# Patient Record
Sex: MALE | Race: WHITE | Employment: OTHER | URBAN - METROPOLITAN AREA
[De-identification: names, ages, dates, MRNs, and addresses within clinical notes are randomized per-mention and may not be internally consistent; named-entity substitution may affect disease eponyms.]

---

## 2017-12-19 ENCOUNTER — TELEPHONE (OUTPATIENT)
Dept: INTERNAL MEDICINE CLINIC | Facility: CLINIC | Age: 47
End: 2017-12-19

## 2017-12-19 DIAGNOSIS — Z12.5 SCREENING FOR PROSTATE CANCER: ICD-10-CM

## 2017-12-19 DIAGNOSIS — Z13.0 SCREENING FOR BLOOD DISEASE: Primary | ICD-10-CM

## 2017-12-19 DIAGNOSIS — Z13.220 SCREENING FOR LIPID DISORDERS: ICD-10-CM

## 2017-12-19 DIAGNOSIS — Z13.29 SCREENING FOR THYROID DISORDER: ICD-10-CM

## 2017-12-19 DIAGNOSIS — Z13.228 SCREENING FOR METABOLIC DISORDER: ICD-10-CM

## 2017-12-19 NOTE — TELEPHONE ENCOUNTER
Please place orders for lab work to send to [facility] for upcoming physical on 1-5-2018  He wants to go to the lab on 12-27  thanks

## 2018-01-05 ENCOUNTER — OFFICE VISIT (OUTPATIENT)
Dept: INTERNAL MEDICINE CLINIC | Facility: CLINIC | Age: 48
End: 2018-01-05

## 2018-01-05 VITALS
HEIGHT: 74 IN | TEMPERATURE: 99 F | HEART RATE: 92 BPM | DIASTOLIC BLOOD PRESSURE: 68 MMHG | BODY MASS INDEX: 25.67 KG/M2 | SYSTOLIC BLOOD PRESSURE: 118 MMHG | WEIGHT: 200 LBS

## 2018-01-05 DIAGNOSIS — Z00.00 ROUTINE PHYSICAL EXAMINATION: Primary | ICD-10-CM

## 2018-01-05 PROCEDURE — 90714 TD VACC NO PRESV 7 YRS+ IM: CPT | Performed by: NURSE PRACTITIONER

## 2018-01-05 PROCEDURE — 99386 PREV VISIT NEW AGE 40-64: CPT | Performed by: NURSE PRACTITIONER

## 2018-01-05 PROCEDURE — 90471 IMMUNIZATION ADMIN: CPT | Performed by: NURSE PRACTITIONER

## 2018-01-05 NOTE — PROGRESS NOTES
Clarence Tirado is a 41year old male who presents to establish with our practice and for a complete physical exam.     HPI:   Pt has no complaints. Works as [employer]. Does not exercise routinely. Feels well. Take no prescription medications.      Wt Readin pain/tenderness. Denies neck stiffness. LUNGS: denies dyspnea, wheezing, SOB, QUESADA, cough. CARDIOVASCULAR: denies chest pain on exertion, palpitations, edema, orthopnea. GI: denies abdominal pain, heartburn, diarrhea or constipation.   : denies dysuria, slightly elevated. Discussed exercise will help with these numbers. Td booster given today. F/U in 1 year for CPE, sooner if problems.        Orders Placed This Encounter      Td (Tenivac) (10187) (DX V06.5/Z23)    Meds & Refills for this Visit:  No prescri

## 2018-04-30 ENCOUNTER — OFFICE VISIT (OUTPATIENT)
Dept: INTERNAL MEDICINE CLINIC | Facility: CLINIC | Age: 48
End: 2018-04-30

## 2018-04-30 VITALS
BODY MASS INDEX: 25.41 KG/M2 | WEIGHT: 198 LBS | DIASTOLIC BLOOD PRESSURE: 74 MMHG | SYSTOLIC BLOOD PRESSURE: 118 MMHG | HEIGHT: 74 IN | TEMPERATURE: 99 F | RESPIRATION RATE: 16 BRPM | HEART RATE: 72 BPM

## 2018-04-30 DIAGNOSIS — R10.84 GENERALIZED ABDOMINAL PAIN: Primary | ICD-10-CM

## 2018-04-30 PROCEDURE — 99213 OFFICE O/P EST LOW 20 MIN: CPT | Performed by: NURSE PRACTITIONER

## 2018-04-30 RX ORDER — SIMETHICONE 80 MG
80 TABLET,CHEWABLE ORAL 3 TIMES DAILY PRN
Qty: 30 TABLET | Refills: 0 | Status: SHIPPED | OUTPATIENT
Start: 2018-04-30 | End: 2018-05-07

## 2018-04-30 RX ORDER — OMEPRAZOLE 40 MG/1
40 CAPSULE, DELAYED RELEASE ORAL DAILY
Qty: 30 CAPSULE | Refills: 0 | Status: SHIPPED | OUTPATIENT
Start: 2018-04-30 | End: 2018-05-07

## 2018-04-30 NOTE — PROGRESS NOTES
Patient presents with:  Stomach Pain: pt is having stomach pain x 7 weeks. LB-room 7      HPI:  Presents with approx 7 week history of intermittent abd pain with associated bloating and \"gassy\" feeling.  Stated has had occasional nausea with abd pains but normoactive. Skin: Skin is warm and dry. No rash noted. No erythema. No pallor. A/P:    Generalized abdominal pain  (primary encounter diagnosis)- Unclear etiology. Start omeprazole. Simethicone PRN. Check CBC/CMP. See Dr. Aida Hernandez- GI.  Verbalized und

## 2018-08-21 ENCOUNTER — HOSPITAL ENCOUNTER (OUTPATIENT)
Dept: MRI IMAGING | Facility: HOSPITAL | Age: 48
Discharge: HOME | End: 2018-08-21
Attending: INTERNAL MEDICINE
Payer: COMMERCIAL

## 2018-08-21 DIAGNOSIS — R10.13 EPIGASTRIC PAIN: ICD-10-CM

## 2018-08-21 DIAGNOSIS — R63.4 WEIGHT LOSS: ICD-10-CM

## 2018-08-21 DIAGNOSIS — R19.7 DIARRHEA, UNSPECIFIED TYPE: ICD-10-CM

## 2018-08-21 PROCEDURE — A9576 INJ PROHANCE MULTIPACK: HCPCS | Performed by: INTERNAL MEDICINE

## 2018-08-21 PROCEDURE — 74183 MRI ABD W/O CNTR FLWD CNTR: CPT | Performed by: INTERNAL MEDICINE

## 2018-08-21 PROCEDURE — 72197 MRI PELVIS W/O & W/DYE: CPT | Performed by: INTERNAL MEDICINE

## 2019-01-04 ENCOUNTER — WALK IN (OUTPATIENT)
Dept: URGENT CARE | Age: 49
End: 2019-01-04

## 2019-01-04 VITALS
SYSTOLIC BLOOD PRESSURE: 104 MMHG | HEIGHT: 74 IN | BODY MASS INDEX: 24.64 KG/M2 | DIASTOLIC BLOOD PRESSURE: 72 MMHG | TEMPERATURE: 98.9 F | RESPIRATION RATE: 18 BRPM | WEIGHT: 192 LBS | HEART RATE: 97 BPM

## 2019-01-04 DIAGNOSIS — B96.89 ACUTE BACTERIAL SINUSITIS: Primary | ICD-10-CM

## 2019-01-04 DIAGNOSIS — J01.90 ACUTE BACTERIAL SINUSITIS: Primary | ICD-10-CM

## 2019-01-04 PROCEDURE — 99203 OFFICE O/P NEW LOW 30 MIN: CPT | Performed by: NURSE PRACTITIONER

## 2019-01-04 RX ORDER — FLUTICASONE PROPIONATE 50 MCG
SPRAY, SUSPENSION (ML) NASAL
Qty: 16 G | Refills: 0 | Status: SHIPPED | OUTPATIENT
Start: 2019-01-04

## 2019-01-04 RX ORDER — AMOXICILLIN AND CLAVULANATE POTASSIUM 875; 125 MG/1; MG/1
1 TABLET, FILM COATED ORAL EVERY 12 HOURS
Qty: 20 TABLET | Refills: 0 | Status: SHIPPED | OUTPATIENT
Start: 2019-01-04 | End: 2019-01-14

## 2019-01-04 SDOH — HEALTH STABILITY: MENTAL HEALTH: HOW OFTEN DO YOU HAVE A DRINK CONTAINING ALCOHOL?: NEVER

## 2019-01-04 ASSESSMENT — ENCOUNTER SYMPTOMS
APPETITE CHANGE: 1
VOMITING: 0
WEAKNESS: 0
CONSTIPATION: 0
ADENOPATHY: 0
SHORTNESS OF BREATH: 0
SINUS PAIN: 1
STRIDOR: 0
DIARRHEA: 0
CHILLS: 1
HEADACHES: 1
EYE REDNESS: 0
WHEEZING: 0
SINUS PRESSURE: 1
ABDOMINAL PAIN: 0
EYE PAIN: 0
RHINORRHEA: 0
EYE DISCHARGE: 0
EYE ITCHING: 0
FATIGUE: 0
FEVER: 0
ACTIVITY CHANGE: 0
SORE THROAT: 0
TROUBLE SWALLOWING: 0
CHEST TIGHTNESS: 0
NAUSEA: 0
COUGH: 1
PHOTOPHOBIA: 0

## 2019-12-09 ENCOUNTER — TELEPHONE (OUTPATIENT)
Dept: INTERNAL MEDICINE CLINIC | Facility: CLINIC | Age: 49
End: 2019-12-09

## 2019-12-09 DIAGNOSIS — Z13.29 SCREENING FOR THYROID DISORDER: ICD-10-CM

## 2019-12-09 DIAGNOSIS — Z12.5 SCREENING FOR PROSTATE CANCER: ICD-10-CM

## 2019-12-09 DIAGNOSIS — Z13.228 SCREENING FOR METABOLIC DISORDER: ICD-10-CM

## 2019-12-09 DIAGNOSIS — Z00.00 ROUTINE PHYSICAL EXAMINATION: Primary | ICD-10-CM

## 2019-12-09 DIAGNOSIS — Z13.220 SCREENING FOR LIPID DISORDERS: ICD-10-CM

## 2019-12-09 DIAGNOSIS — Z13.0 SCREENING FOR BLOOD DISEASE: ICD-10-CM

## 2019-12-09 NOTE — TELEPHONE ENCOUNTER
Future Appointments   Date Time Provider Angus Sandra   1/3/2020 10:00 AM Tiago Perez MD [facility]     [facility] to [facility] per patient- Pt aware to fast-no call back required

## 2020-01-03 ENCOUNTER — OFFICE VISIT (OUTPATIENT)
Dept: INTERNAL MEDICINE CLINIC | Facility: CLINIC | Age: 50
End: 2020-01-03
Payer: COMMERCIAL

## 2020-01-03 VITALS
HEIGHT: 72.44 IN | DIASTOLIC BLOOD PRESSURE: 58 MMHG | RESPIRATION RATE: 16 BRPM | HEART RATE: 64 BPM | BODY MASS INDEX: 24.78 KG/M2 | TEMPERATURE: 99 F | SYSTOLIC BLOOD PRESSURE: 104 MMHG | WEIGHT: 185 LBS

## 2020-01-03 DIAGNOSIS — R73.01 ELEVATED FASTING BLOOD SUGAR: ICD-10-CM

## 2020-01-03 DIAGNOSIS — Z00.00 ANNUAL PHYSICAL EXAM: Primary | ICD-10-CM

## 2020-01-03 DIAGNOSIS — R10.13 CHRONIC EPIGASTRIC PAIN: ICD-10-CM

## 2020-01-03 DIAGNOSIS — G89.29 CHRONIC EPIGASTRIC PAIN: ICD-10-CM

## 2020-01-03 PROCEDURE — 99396 PREV VISIT EST AGE 40-64: CPT | Performed by: INTERNAL MEDICINE

## 2020-01-03 RX ORDER — MULTIVIT-MIN/IRON FUM/FOLIC AC 7.5 MG-4
1 TABLET ORAL DAILY
COMMUNITY

## 2020-01-03 NOTE — PROGRESS NOTES
Ansley Farnsworth  11/30/1970    Patient presents with:  Wellness Visit  Vaccinations: defers Flu      HPI:   Ansley Farnsworth is a 54year old male who presents for annual physical examination.     The patient has been in his usual state of health and denies an Drinks per session: 1 or 2      Binge frequency: Never      Comment: CAGE 1/3/20    Drug use: No        REVIEW OF SYSTEMS:   GENERAL: feels well otherwise  SKIN: no rashes  EYES:denies blurred vision or double vision  HEENT: not congested  LUNGS: denies sh Thank you,  Minnie Balderrama MD

## 2020-01-29 LAB — HEMOGLOBIN A1C: 5.6 % OF TOTAL HGB

## 2020-10-08 PROBLEM — M19.041 OSTEOARTHRITIS OF METACARPOPHALANGEAL (MCP) JOINT OF RIGHT MIDDLE FINGER: Status: ACTIVE | Noted: 2020-10-08

## 2020-10-08 PROBLEM — M19.041 OSTEOARTHRITIS OF METACARPOPHALANGEAL (MCP) JOINT OF RIGHT INDEX FINGER: Status: ACTIVE | Noted: 2020-10-08

## 2020-11-30 PROBLEM — M77.11 RIGHT LATERAL EPICONDYLITIS: Status: ACTIVE | Noted: 2020-11-30

## 2021-03-22 ENCOUNTER — TELEPHONE (OUTPATIENT)
Dept: INTERNAL MEDICINE CLINIC | Facility: CLINIC | Age: 51
End: 2021-03-22

## 2021-03-22 DIAGNOSIS — Z13.29 SCREENING FOR THYROID DISORDER: ICD-10-CM

## 2021-03-22 DIAGNOSIS — Z13.0 SCREENING FOR BLOOD DISEASE: ICD-10-CM

## 2021-03-22 DIAGNOSIS — Z13.228 SCREENING FOR METABOLIC DISORDER: ICD-10-CM

## 2021-03-22 DIAGNOSIS — Z13.220 SCREENING FOR LIPID DISORDERS: ICD-10-CM

## 2021-03-22 DIAGNOSIS — Z00.00 ROUTINE PHYSICAL EXAMINATION: Primary | ICD-10-CM

## 2021-03-22 NOTE — TELEPHONE ENCOUNTER
CPE   Future Appointments   Date Time Provider Angus Sandra   4/20/2021  5:20 PM Tiago Perez MD [facility]      Orders to    [facility]   aware must fast no call back required

## 2021-03-22 NOTE — TELEPHONE ENCOUNTER
Bloodwork orders placed to [address] [place] [address] lab for upcoming physical per protocol.

## 2021-03-30 ENCOUNTER — TELEPHONE (OUTPATIENT)
Dept: INTERNAL MEDICINE CLINIC | Facility: CLINIC | Age: 51
End: 2021-03-30

## 2021-03-30 NOTE — TELEPHONE ENCOUNTER
CPE   Future Appointments   Date Time Provider Angus Sandra   5/18/2021  5:20 PM Tiago Perez MD [facility]      Orders to [facility]   [facility] must fast no call back required

## 2021-05-04 ENCOUNTER — PATIENT MESSAGE (OUTPATIENT)
Dept: INTERNAL MEDICINE CLINIC | Facility: CLINIC | Age: 51
End: 2021-05-04

## 2021-05-04 DIAGNOSIS — Z00.00 LABORATORY EXAMINATION ORDERED AS PART OF A COMPLETE PHYSICAL EXAMINATION: ICD-10-CM

## 2021-05-04 DIAGNOSIS — Z12.5 SCREENING PSA (PROSTATE SPECIFIC ANTIGEN): Primary | ICD-10-CM

## 2021-05-04 NOTE — TELEPHONE ENCOUNTER
From: Dallas Favre  To: Eriberto Girard MD  Sent: 5/4/2021 9:17 AM CDT  Subject: Non-Urgent Medical Question    I have blood work scheduled at [facility] on May 10th for my yearly exam scheduled with you on May18th.  I want to have a PSA blood test as well and wa

## 2021-05-11 DIAGNOSIS — R73.01 ELEVATED FASTING BLOOD SUGAR: Primary | ICD-10-CM

## 2021-05-18 ENCOUNTER — OFFICE VISIT (OUTPATIENT)
Dept: INTERNAL MEDICINE CLINIC | Facility: CLINIC | Age: 51
End: 2021-05-18
Payer: COMMERCIAL

## 2021-05-18 VITALS
TEMPERATURE: 98 F | DIASTOLIC BLOOD PRESSURE: 70 MMHG | WEIGHT: 194 LBS | BODY MASS INDEX: 25.71 KG/M2 | HEART RATE: 82 BPM | HEIGHT: 73 IN | SYSTOLIC BLOOD PRESSURE: 114 MMHG

## 2021-05-18 DIAGNOSIS — Z00.00 ROUTINE GENERAL MEDICAL EXAMINATION AT A HEALTH CARE FACILITY: Primary | ICD-10-CM

## 2021-05-18 DIAGNOSIS — M19.041 OSTEOARTHRITIS OF METACARPOPHALANGEAL (MCP) JOINT OF RIGHT MIDDLE FINGER: ICD-10-CM

## 2021-05-18 PROCEDURE — 3008F BODY MASS INDEX DOCD: CPT | Performed by: INTERNAL MEDICINE

## 2021-05-18 PROCEDURE — 99396 PREV VISIT EST AGE 40-64: CPT | Performed by: INTERNAL MEDICINE

## 2021-05-18 PROCEDURE — 3074F SYST BP LT 130 MM HG: CPT | Performed by: INTERNAL MEDICINE

## 2021-05-18 PROCEDURE — 3078F DIAST BP <80 MM HG: CPT | Performed by: INTERNAL MEDICINE

## 2021-05-18 NOTE — PROGRESS NOTES
Shara Barakat  11/30/1970    Patient presents with:  Physical: AJ rm 6 annual PE      HPI:   Shara Barakat is a 52year old male who presents for an annual physical examination.     The patient has a history of osteoarthritis involving the third right MCP PROCEDURE      wisdom teeth   • TONSILLECTOMY  9yrs old      Family History   Problem Relation Age of Onset   • Colon Polyps Father    • Hypertension Father       Social History    Tobacco Use      Smoking status: Never Smoker      Smokeless tobacco: Never understanding of these issues and agrees to the plan. TODAY'S ORDERS     No orders of the defined types were placed in this encounter.       Meds & Refills:  Requested Prescriptions      No prescriptions requested or ordered in this encounter       Imagi

## 2022-03-30 ENCOUNTER — TELEPHONE (OUTPATIENT)
Dept: INTERNAL MEDICINE CLINIC | Facility: CLINIC | Age: 52
End: 2022-03-30

## 2022-03-30 NOTE — TELEPHONE ENCOUNTER
Future Appointments   Date Time Provider Angus Flores   4/26/2022  5:20 PM Tiago Perez MD [facility]     [facility] to [facility]- Pt informed that labs need to be completed no sooner than 2 weeks prior to the appt.  Pt aware to fast-no call back required

## 2022-05-24 LAB
ABSOLUTE BASOPHILS: 20 CELLS/UL (ref 0–200)
ABSOLUTE EOSINOPHILS: 190 CELLS/UL (ref 15–500)
ABSOLUTE LYMPHOCYTES: 1905 CELLS/UL (ref 850–3900)
ABSOLUTE MONOCYTES: 505 CELLS/UL (ref 200–950)
ABSOLUTE NEUTROPHILS: 2380 CELLS/UL (ref 1500–7800)
ALBUMIN/GLOBULIN RATIO: 1.6 (CALC) (ref 1–2.5)
ALBUMIN: 4 G/DL (ref 3.6–5.1)
ALKALINE PHOSPHATASE: 63 U/L (ref 36–130)
ALT: 25 U/L (ref 9–46)
AST: 19 U/L (ref 10–40)
BASOPHILS: 0.4 %
BILIRUBIN, TOTAL: 0.6 MG/DL (ref 0.2–1.2)
BUN/CREATININE RATIO: 31 (CALC) (ref 6–22)
BUN: 28 MG/DL (ref 7–25)
CALCIUM: 8.6 MG/DL (ref 8.6–10.3)
CARBON DIOXIDE: 30 MMOL/L (ref 20–32)
CHLORIDE: 104 MMOL/L (ref 98–110)
CHOL/HDLC RATIO: 3.7 (CALC)
CHOLESTEROL, TOTAL: 190 MG/DL
CREATININE: 0.89 MG/DL (ref 0.6–1.35)
EGFR IF AFRICN AM: 116 ML/MIN/1.73M2
EGFR IF NONAFRICN AM: 100 ML/MIN/1.73M2
EOSINOPHILS: 3.8 %
GLOBULIN: 2.5 G/DL (CALC) (ref 1.9–3.7)
GLUCOSE: 100 MG/DL (ref 65–99)
HDL CHOLESTEROL: 52 MG/DL
HEMATOCRIT: 43.6 % (ref 38.5–50)
HEMOGLOBIN: 14.8 G/DL (ref 13.2–17.1)
LDL-CHOLESTEROL: 118 MG/DL (CALC)
LYMPHOCYTES: 38.1 %
MCH: 30.3 PG (ref 27–33)
MCHC: 33.9 G/DL (ref 32–36)
MCV: 89.2 FL (ref 80–100)
MONOCYTES: 10.1 %
MPV: 10.2 FL (ref 7.5–12.5)
NEUTROPHILS: 47.6 %
NON-HDL CHOLESTEROL: 138 MG/DL (CALC)
PLATELET COUNT: 192 THOUSAND/UL (ref 140–400)
POTASSIUM: 4.3 MMOL/L (ref 3.5–5.3)
PROTEIN, TOTAL: 6.5 G/DL (ref 6.1–8.1)
PSA, TOTAL: 0.43 NG/ML
RDW: 11.7 % (ref 11–15)
RED BLOOD CELL COUNT: 4.89 MILLION/UL (ref 4.2–5.8)
SODIUM: 140 MMOL/L (ref 135–146)
TRIGLYCERIDES: 97 MG/DL
TSH W/REFLEX TO FT4: 1.86 MIU/L (ref 0.4–4.5)
WHITE BLOOD CELL COUNT: 5 THOUSAND/UL (ref 3.8–10.8)

## 2022-06-07 ENCOUNTER — OFFICE VISIT (OUTPATIENT)
Dept: INTERNAL MEDICINE CLINIC | Facility: CLINIC | Age: 52
End: 2022-06-07
Payer: COMMERCIAL

## 2022-06-07 VITALS
WEIGHT: 206 LBS | BODY MASS INDEX: 27.3 KG/M2 | HEART RATE: 74 BPM | TEMPERATURE: 98 F | SYSTOLIC BLOOD PRESSURE: 112 MMHG | HEIGHT: 73 IN | DIASTOLIC BLOOD PRESSURE: 82 MMHG

## 2022-06-07 DIAGNOSIS — Z00.00 ROUTINE GENERAL MEDICAL EXAMINATION AT A HEALTH CARE FACILITY: Primary | ICD-10-CM

## 2022-06-07 PROCEDURE — 96127 BRIEF EMOTIONAL/BEHAV ASSMT: CPT | Performed by: INTERNAL MEDICINE

## 2022-06-07 PROCEDURE — 3008F BODY MASS INDEX DOCD: CPT | Performed by: INTERNAL MEDICINE

## 2022-06-07 PROCEDURE — 3079F DIAST BP 80-89 MM HG: CPT | Performed by: INTERNAL MEDICINE

## 2022-06-07 PROCEDURE — 99396 PREV VISIT EST AGE 40-64: CPT | Performed by: INTERNAL MEDICINE

## 2022-06-07 PROCEDURE — 3074F SYST BP LT 130 MM HG: CPT | Performed by: INTERNAL MEDICINE

## 2022-12-06 ENCOUNTER — E-VISIT (OUTPATIENT)
Dept: TELEHEALTH | Age: 52
End: 2022-12-06

## 2022-12-06 DIAGNOSIS — U07.1 POSITIVE SELF-ADMINISTERED ANTIGEN TEST FOR COVID-19: Primary | ICD-10-CM

## 2023-04-05 ENCOUNTER — E-VISIT (OUTPATIENT)
Dept: TELEHEALTH | Age: 53
End: 2023-04-05

## 2023-04-05 DIAGNOSIS — J01.00 ACUTE NON-RECURRENT MAXILLARY SINUSITIS: Primary | ICD-10-CM

## 2023-04-05 RX ORDER — AMOXICILLIN AND CLAVULANATE POTASSIUM 875; 125 MG/1; MG/1
1 TABLET, FILM COATED ORAL 2 TIMES DAILY
Qty: 14 TABLET | Refills: 0 | Status: SHIPPED | OUTPATIENT
Start: 2023-04-05 | End: 2023-04-12

## 2023-06-27 PROBLEM — Z86.010 HISTORY OF ADENOMATOUS POLYP OF COLON: Status: ACTIVE | Noted: 2023-06-27

## 2023-06-27 PROBLEM — D12.0 BENIGN NEOPLASM OF CECUM: Status: ACTIVE | Noted: 2023-06-27

## 2023-06-27 PROBLEM — Z86.0101 HISTORY OF ADENOMATOUS POLYP OF COLON: Status: ACTIVE | Noted: 2023-06-27

## 2023-06-27 PROCEDURE — 88305 TISSUE EXAM BY PATHOLOGIST: CPT | Performed by: INTERNAL MEDICINE

## 2023-07-24 ENCOUNTER — TELEPHONE (OUTPATIENT)
Dept: INTERNAL MEDICINE CLINIC | Facility: CLINIC | Age: 53
End: 2023-07-24

## 2023-07-24 DIAGNOSIS — Z13.29 SCREENING FOR THYROID DISORDER: ICD-10-CM

## 2023-07-24 DIAGNOSIS — Z13.220 SCREENING FOR LIPID DISORDERS: ICD-10-CM

## 2023-07-24 DIAGNOSIS — Z13.0 SCREENING FOR DISORDER OF BLOOD AND BLOOD-FORMING ORGANS: ICD-10-CM

## 2023-07-24 DIAGNOSIS — Z00.00 ROUTINE GENERAL MEDICAL EXAMINATION AT A HEALTH CARE FACILITY: Primary | ICD-10-CM

## 2023-07-24 DIAGNOSIS — Z13.228 SCREENING FOR METABOLIC DISORDER: ICD-10-CM

## 2023-07-24 NOTE — TELEPHONE ENCOUNTER
Future Appointments   Date Time Provider Angus Flores   9/21/2023  2:20 PM Doroteo Mason MD [facility]     Informed must fast no call back required.  Orders to [facility]

## 2023-09-12 LAB
ABSOLUTE BASOPHILS: 22 CELLS/UL (ref 0–200)
ABSOLUTE EOSINOPHILS: 182 CELLS/UL (ref 15–500)
ABSOLUTE LYMPHOCYTES: 1722 CELLS/UL (ref 850–3900)
ABSOLUTE MONOCYTES: 473 CELLS/UL (ref 200–950)
ABSOLUTE NEUTROPHILS: 3102 CELLS/UL (ref 1500–7800)
ALBUMIN/GLOBULIN RATIO: 1.6 (CALC) (ref 1–2.5)
ALBUMIN: 4.1 G/DL (ref 3.6–5.1)
ALKALINE PHOSPHATASE: 72 U/L (ref 35–144)
ALT: 20 U/L (ref 9–46)
AST: 16 U/L (ref 10–35)
BASOPHILS: 0.4 %
BILIRUBIN, TOTAL: 0.5 MG/DL (ref 0.2–1.2)
BUN: 24 MG/DL (ref 7–25)
CALCIUM: 8.6 MG/DL (ref 8.6–10.3)
CARBON DIOXIDE: 31 MMOL/L (ref 20–32)
CHLORIDE: 103 MMOL/L (ref 98–110)
CHOL/HDLC RATIO: 4 (CALC)
CHOLESTEROL, TOTAL: 211 MG/DL
CREATININE: 1.05 MG/DL (ref 0.7–1.3)
EGFR: 86 ML/MIN/1.73M2
EOSINOPHILS: 3.3 %
GLOBULIN: 2.5 G/DL (CALC) (ref 1.9–3.7)
GLUCOSE: 102 MG/DL (ref 65–99)
HDL CHOLESTEROL: 53 MG/DL
HEMATOCRIT: 45.5 % (ref 38.5–50)
HEMOGLOBIN: 15.3 G/DL (ref 13.2–17.1)
LDL-CHOLESTEROL: 140 MG/DL (CALC)
LYMPHOCYTES: 31.3 %
MCH: 30.7 PG (ref 27–33)
MCHC: 33.6 G/DL (ref 32–36)
MCV: 91.4 FL (ref 80–100)
MONOCYTES: 8.6 %
MPV: 10.3 FL (ref 7.5–12.5)
NEUTROPHILS: 56.4 %
NON-HDL CHOLESTEROL: 158 MG/DL (CALC)
PLATELET COUNT: 189 THOUSAND/UL (ref 140–400)
POTASSIUM: 4.5 MMOL/L (ref 3.5–5.3)
PROTEIN, TOTAL: 6.6 G/DL (ref 6.1–8.1)
RDW: 11.8 % (ref 11–15)
RED BLOOD CELL COUNT: 4.98 MILLION/UL (ref 4.2–5.8)
SODIUM: 140 MMOL/L (ref 135–146)
TRIGLYCERIDES: 81 MG/DL
TSH W/REFLEX TO FT4: 1.84 MIU/L (ref 0.4–4.5)
WHITE BLOOD CELL COUNT: 5.5 THOUSAND/UL (ref 3.8–10.8)

## 2023-09-21 ENCOUNTER — OFFICE VISIT (OUTPATIENT)
Dept: INTERNAL MEDICINE CLINIC | Facility: CLINIC | Age: 53
End: 2023-09-21
Payer: COMMERCIAL

## 2023-09-21 VITALS
WEIGHT: 200.63 LBS | BODY MASS INDEX: 28.09 KG/M2 | HEIGHT: 71 IN | DIASTOLIC BLOOD PRESSURE: 68 MMHG | SYSTOLIC BLOOD PRESSURE: 108 MMHG | RESPIRATION RATE: 18 BRPM | TEMPERATURE: 97 F | HEART RATE: 94 BPM | OXYGEN SATURATION: 95 %

## 2023-09-21 DIAGNOSIS — R73.01 ELEVATED FASTING BLOOD SUGAR: ICD-10-CM

## 2023-09-21 DIAGNOSIS — Z00.00 ROUTINE GENERAL MEDICAL EXAMINATION AT A HEALTH CARE FACILITY: Primary | ICD-10-CM

## 2023-09-21 DIAGNOSIS — E78.00 ELEVATED LDL CHOLESTEROL LEVEL: ICD-10-CM

## 2023-09-21 PROCEDURE — 3008F BODY MASS INDEX DOCD: CPT | Performed by: INTERNAL MEDICINE

## 2023-09-21 PROCEDURE — 3074F SYST BP LT 130 MM HG: CPT | Performed by: INTERNAL MEDICINE

## 2023-09-21 PROCEDURE — 3078F DIAST BP <80 MM HG: CPT | Performed by: INTERNAL MEDICINE

## 2023-09-21 PROCEDURE — 99396 PREV VISIT EST AGE 40-64: CPT | Performed by: INTERNAL MEDICINE

## 2023-12-17 ENCOUNTER — E-VISIT (OUTPATIENT)
Dept: TELEHEALTH | Age: 53
End: 2023-12-17
Payer: COMMERCIAL

## 2023-12-17 DIAGNOSIS — R05.1 ACUTE COUGH: ICD-10-CM

## 2023-12-17 DIAGNOSIS — J01.90 ACUTE RHINOSINUSITIS: Primary | ICD-10-CM

## 2023-12-17 RX ORDER — AMOXICILLIN AND CLAVULANATE POTASSIUM 875; 125 MG/1; MG/1
1 TABLET, FILM COATED ORAL 2 TIMES DAILY WITH MEALS
Qty: 14 TABLET | Refills: 0 | Status: SHIPPED | OUTPATIENT
Start: 2023-12-17 | End: 2023-12-24

## 2023-12-17 RX ORDER — BENZONATATE 200 MG/1
200 CAPSULE ORAL 3 TIMES DAILY PRN
Qty: 20 CAPSULE | Refills: 0 | Status: SHIPPED | OUTPATIENT
Start: 2023-12-17 | End: 2023-12-24

## 2023-12-17 NOTE — PROGRESS NOTES
Lori Dias is a 48year old male submitting e-visit for sinus symptoms. HPI:   See answers to questionnaire and copygramt message exchange    Current Outpatient Medications   Medication Sig Dispense Refill    PEG 3350-KCl-NaBcb-NaCl-NaSulf (PEG 3350/ELECTROLYTES) 240 g Oral Recon Soln Take as directed by physician. (Patient not taking: Reported on 9/21/2023) 4000 mL 0    Multiple Vitamins-Minerals (MULTI-VITAMIN/MINERALS) Oral Tab Take 1 tablet by mouth daily. Past Medical History:   Diagnosis Date    Abdominal pain 7wks    not constantly    Arthritis     Belching     Bloating     not constantly    Diarrhea, unspecified     not constantly    Flatulence/gas pain/belching     not constantly    Heartburn     Irregular bowel habits     Osteoarthritis of metacarpophalangeal (MCP) joint of right middle finger 10/08/2020    Pain in joints     Stress     Wears glasses       Past Surgical History:   Procedure Laterality Date    COLONOSCOPY  06/06/2018    Repeat colonoscopy in 5 years with Dr. Margot Ortega, colon polyps,internal hemorroids    EGD  06/06/2018    ORAL SURGERY PROCEDURE      wisdom teeth    TONSILLECTOMY  9yrs old      Family History   Problem Relation Age of Onset    Colon Polyps Father     Hypertension Father       Social History:  Social History     Socioeconomic History    Marital status:    Tobacco Use    Smoking status: Never    Smokeless tobacco: Never   Vaping Use    Vaping Use: Never used   Substance and Sexual Activity    Alcohol use: Yes     Comment: CAGE 1/3/20    Drug use: No    Sexual activity: Yes         ASSESSMENT AND PLAN:       Diagnoses and all orders for this visit:    Acute rhinosinusitis  -     amoxicillin clavulanate 875-125 MG Oral Tab; Take 1 tablet by mouth 2 (two) times daily with meals for 7 days. Take with food. Acute cough  -     benzonatate 200 MG Oral Cap; Take 1 capsule (200 mg total) by mouth 3 (three) times daily as needed for cough. Swallow whole.   Do not open or dissolve capsule.       Will treat with medication as listed above  Info provided on use, dose, and possible side effects  Comfort measures  Patient advised to follow up with PCP if no improvement or worsening of symptoms  Refer to [place] [place] message exchange for specific patient instructions      Duration of  the service:  9 minutes

## 2024-07-19 PROBLEM — Z86.0100 PERSONAL HISTORY OF COLONIC POLYPS: Status: ACTIVE | Noted: 2024-07-19

## 2024-07-19 PROBLEM — D12.8 BENIGN NEOPLASM OF RECTUM: Status: ACTIVE | Noted: 2024-07-19

## 2024-11-05 DIAGNOSIS — Z13.29 SCREENING FOR THYROID DISORDER: Primary | ICD-10-CM

## 2024-11-05 DIAGNOSIS — Z13.220 SCREENING FOR LIPID DISORDERS: ICD-10-CM

## 2024-11-05 DIAGNOSIS — Z12.5 SPECIAL SCREENING FOR MALIGNANT NEOPLASM OF PROSTATE: ICD-10-CM

## 2024-11-05 DIAGNOSIS — Z13.228 SCREENING FOR METABOLIC DISORDER: ICD-10-CM

## 2024-11-05 DIAGNOSIS — Z00.00 ROUTINE GENERAL MEDICAL EXAMINATION AT A HEALTH CARE FACILITY: ICD-10-CM

## 2024-11-05 DIAGNOSIS — Z13.0 SCREENING FOR DISORDER OF BLOOD AND BLOOD-FORMING ORGANS: ICD-10-CM

## 2024-11-22 LAB
ABSOLUTE BASOPHILS: 9 CELLS/UL (ref 0–200)
ABSOLUTE EOSINOPHILS: 90 CELLS/UL (ref 15–500)
ABSOLUTE LYMPHOCYTES: 1679 CELLS/UL (ref 850–3900)
ABSOLUTE MONOCYTES: 500 CELLS/UL (ref 200–950)
ABSOLUTE NEUTROPHILS: 2223 CELLS/UL (ref 1500–7800)
ALBUMIN/GLOBULIN RATIO: 1.6 (CALC) (ref 1–2.5)
ALBUMIN: 3.8 G/DL (ref 3.6–5.1)
ALKALINE PHOSPHATASE: 57 U/L (ref 35–144)
ALT: 18 U/L (ref 9–46)
AST: 16 U/L (ref 10–35)
BASOPHILS: 0.2 %
BILIRUBIN, TOTAL: 0.6 MG/DL (ref 0.2–1.2)
BUN: 19 MG/DL (ref 7–25)
CALCIUM: 8.7 MG/DL (ref 8.6–10.3)
CARBON DIOXIDE: 31 MMOL/L (ref 20–32)
CHLORIDE: 106 MMOL/L (ref 98–110)
CHOL/HDLC RATIO: 3.1 (CALC)
CHOLESTEROL, TOTAL: 144 MG/DL
CREATININE: 1.02 MG/DL (ref 0.7–1.3)
EGFR: 89 ML/MIN/1.73M2
EOSINOPHILS: 2 %
GLOBULIN: 2.4 G/DL (CALC) (ref 1.9–3.7)
GLUCOSE: 90 MG/DL (ref 65–99)
HDL CHOLESTEROL: 47 MG/DL
HEMATOCRIT: 46.9 % (ref 38.5–50)
HEMOGLOBIN: 14.9 G/DL (ref 13.2–17.1)
LDL-CHOLESTEROL: 81 MG/DL (CALC)
LYMPHOCYTES: 37.3 %
MCH: 30.1 PG (ref 27–33)
MCHC: 31.8 G/DL (ref 32–36)
MCV: 94.7 FL (ref 80–100)
MONOCYTES: 11.1 %
MPV: 10.4 FL (ref 7.5–12.5)
NEUTROPHILS: 49.4 %
NON-HDL CHOLESTEROL: 97 MG/DL (CALC)
PLATELET COUNT: 196 THOUSAND/UL (ref 140–400)
POTASSIUM: 4.8 MMOL/L (ref 3.5–5.3)
PROTEIN, TOTAL: 6.2 G/DL (ref 6.1–8.1)
PSA, TOTAL: 0.61 NG/ML
RDW: 11.6 % (ref 11–15)
RED BLOOD CELL COUNT: 4.95 MILLION/UL (ref 4.2–5.8)
SODIUM: 142 MMOL/L (ref 135–146)
TRIGLYCERIDES: 78 MG/DL
TSH W/REFLEX TO FT4: 1.36 MIU/L (ref 0.4–4.5)
WHITE BLOOD CELL COUNT: 4.5 THOUSAND/UL (ref 3.8–10.8)

## 2024-12-02 ENCOUNTER — OFFICE VISIT (OUTPATIENT)
Dept: INTERNAL MEDICINE CLINIC | Facility: CLINIC | Age: 54
End: 2024-12-02
Payer: COMMERCIAL

## 2024-12-02 VITALS
BODY MASS INDEX: 25.06 KG/M2 | HEIGHT: 72 IN | WEIGHT: 185 LBS | RESPIRATION RATE: 16 BRPM | DIASTOLIC BLOOD PRESSURE: 82 MMHG | HEART RATE: 72 BPM | OXYGEN SATURATION: 99 % | SYSTOLIC BLOOD PRESSURE: 120 MMHG | TEMPERATURE: 97 F

## 2024-12-02 DIAGNOSIS — Z00.00 WELLNESS EXAMINATION: Primary | ICD-10-CM

## 2024-12-02 DIAGNOSIS — Z23 NEED FOR SHINGLES VACCINE: ICD-10-CM

## 2024-12-02 DIAGNOSIS — F43.22 ADJUSTMENT DISORDER WITH ANXIOUS MOOD: ICD-10-CM

## 2024-12-02 NOTE — PROGRESS NOTES
Karlo Faye  11/30/1970    Chief Complaint   Patient presents with    Physical     Rm 12 SS  Reviewed Preventative/Wellness form with patient.       SUBJECTIVE   Karlo Faye is a 54 year old male who presents for annual physical examination.  He has been experiencing anxiety.  The patient is currently .  He has been seeing a therapist for the last 1.5 years.  His daughter also has a history of anxiety and urged her daughter to inquire about pharmacotherapy.  He admits to struggling with feelings of anger in the past.  He typically focuses on the negatives and then is concerned by this.  He sometimes feels down, but has not lost interest, no change in sleep or diet.  He exercises regularly.  He was seeing [facility] for low back pain that improved after taking a steroid pack.    Review of Systems   Review of Systems   No f/c/chest pain or sob. No cough. No abd pain/n/v/d. No ha or dizziness. No numbness, tingling, or weakness.     OBJECTIVE:   /82   Pulse 72   Temp 96.7 °F (35.9 °C) (Temporal)   Resp 16   Ht 6' (1.829 m)   Wt 185 lb (83.9 kg)   SpO2 99%   BMI 25.09 kg/m²   Physical Exam   Constitutional: Oriented to person, place, and time. No distress.   HEENT:  Normocephalic and atraumatic.Tympanic membranes appear normal. Oropharynx is clear and moist.   Eyes: Conjunctivae not injected.  Extraocular movements are intact  Neck: Full ROM.  Neck supple.  No thyromegaly  Cardiovascular: Normal rate, regular rhythm and intact distal pulses.  No murmur, rubs or gallops.   Pulmonary/Chest: Effort normal and breath sounds normal. No respiratory distress.  Abdominal: Soft. Bowel sounds are normal. Non tender, no masses, no organomegaly or hernias.  Musculoskeletal: No edema in bilateral lower extremities.  Strength is 5/5 in bilateral upper and lower extremities.  No tenderness of cervical, thoracic or lumbar spinous processes.  Lymphadenopathy: No cervical adenopathy.   Neurological: No focal  neurologic deficits.  Cranial nerves II through XII are intact.  Reflexes are 2+.  Skin: Skin is warm and dry. No rash on visualized skin.  Psychiatric: Normal mood and affect.       Lab Results   Component Value Date    GLU 90 11/21/2024    BUN 19 11/21/2024    CREATSERUM 1.02 11/21/2024    BUNCREA SEE NOTE: 11/21/2024    GFRAA 116 05/23/2022    GFRNAA 100 05/23/2022    CA 8.7 11/21/2024     11/21/2024    K 4.8 11/21/2024     11/21/2024    CO2 31 11/21/2024      Lab Results   Component Value Date    WBC 4.5 11/21/2024    RBC 4.95 11/21/2024    HGB 14.9 11/21/2024    HCT 46.9 11/21/2024    MCV 94.7 11/21/2024    MCH 30.1 11/21/2024    MCHC 31.8 (L) 11/21/2024    RDW 11.6 11/21/2024     11/21/2024      Lab Results   Component Value Date    TSHT4 1.36 11/21/2024        ASSESSMENT AND PLAN:       ICD-10-CM    1. Wellness examination  Z00.00 Issue patient had hyperglycemia and hypercholesterolemia both are now resolved on most recent labs,  Likely due to eczema lifestyle modifications.  Declines flu shot.      2. Adjustment disorder with anxious mood  F43.22 FLUoxetine 20 MG Oral Cap  Trial of SSRI for anxiety in the setting of recent family stressors.  Follow-up in 1 month for reassessment.      3. Need for shingles vaccine  Z23 Zoster Recombinant Adjuvanted (Shingrix -Shingles) [84178]              TODAY'S ORDERS     No orders of the defined types were placed in this encounter.      Meds & Refills:  Requested Prescriptions      No prescriptions requested or ordered in this encounter       Imaging & Consults:  None    No follow-ups on file.  There are no Patient Instructions on file for this visit.    All questions were answered and the patient agrees with the plan.     Thank you,  Sylwia Rojo, DO

## 2024-12-29 ENCOUNTER — PATIENT MESSAGE (OUTPATIENT)
Dept: INTERNAL MEDICINE CLINIC | Facility: CLINIC | Age: 54
End: 2024-12-29

## 2024-12-31 DIAGNOSIS — F43.22 ADJUSTMENT DISORDER WITH ANXIOUS MOOD: ICD-10-CM

## 2025-01-07 NOTE — TELEPHONE ENCOUNTER
New medication started 12/0/2/2024    FLUoxetine HCl 20 mg Oral Daily    Requested Prescriptions   Pending Prescriptions Disp Refills    FLUoxetine 20 MG Oral Cap 30 capsule 0     Sig: Take 1 capsule (20 mg total) by mouth daily.       Psychiatric Non-Scheduled (Anti-Anxiety) Passed - 1/7/2025  8:50 AM        Passed - In person appointment or virtual visit in the past 6 mos or appointment in next 3 mos     Recent Outpatient Visits              1 month ago Wellness examination    [address] Sylwia Rojo, DO    Office Visit    1 year ago Acute rhinosinusitis    [facility], Virtual Visit Violeta Marion APRN    E-Visit    1 year ago Routine general medical examination at a health care facility    [address] Tiago Perez MD    Office Visit    1 year ago Acute non-recurrent maxillary sinusitis    [facility], Virtual Visit Kandace Bowman PA-C    E-Visit    2 years ago Positive self-administered antigen test for COVID-19    [facility], Virtual Visit Radha Rosas PA-C    E-Visit          Future Appointments         Provider Department Appt Notes    In 2 weeks Tiago Perez MD [address] follow up -pt wants to keep AD as PCP saw MP for a sooner appt which was offered to him. jr                    Passed - Depression Screening completed within the past 12 months               Future Appointments         Provider Department Appt Notes    In 2 weeks Tiago Perez MD [address] follow up -pt wants to keep AD as PCP saw MP for a sooner appt which was offered to him.           Recent Outpatient Visits              1 month ago Wellness examination    [address] Sylwia Rojo, DO    Office Visit    1 year ago Acute  rhinosinusitis    [facility], Virtual Visit Violeta Marion APRN    E-Visit    1 year ago Routine general medical examination at a health care facility    [facility], [address] [place] [address], Tiago Bartlett MD    Office Visit    1 year ago Acute non-recurrent maxillary sinusitis    [facility], Virtual Visit Kandace Bowman PA-C    E-Visit    2 years ago Positive self-administered antigen test for COVID-19    [facility], Virtual Visit Radha Rosas PA-C    E-Visit

## (undated) DIAGNOSIS — Z00.00 ROUTINE GENERAL MEDICAL EXAMINATION AT A HEALTH CARE FACILITY: Primary | ICD-10-CM

## (undated) DIAGNOSIS — Z13.220 SCREENING FOR LIPID DISORDERS: ICD-10-CM

## (undated) DIAGNOSIS — Z13.0 SCREENING FOR DISORDER OF BLOOD AND BLOOD-FORMING ORGANS: ICD-10-CM

## (undated) DIAGNOSIS — Z12.5 SCREENING FOR MALIGNANT NEOPLASM OF PROSTATE: ICD-10-CM

## (undated) DIAGNOSIS — Z13.228 SCREENING FOR METABOLIC DISORDER: ICD-10-CM

## (undated) DIAGNOSIS — Z13.29 SCREENING FOR THYROID DISORDER: ICD-10-CM

## (undated) NOTE — LETTER
04/21/21        [address] [place] [address] [address]  [facility] [id number]      Dear Ihsan Eli,    Our records indicate that you have outstanding lab work and or testing that was ordered for you and has not yet been completed:  Orders Placed This Encounter

## (undated) NOTE — LETTER
06/03/21        [address] [place] [id number]      Dear Clare Harper,    Our records indicate that you have outstanding lab work and or testing that was ordered for you and has not yet been completed:  Orders Placed This Encounter